# Patient Record
Sex: MALE | Race: WHITE
[De-identification: names, ages, dates, MRNs, and addresses within clinical notes are randomized per-mention and may not be internally consistent; named-entity substitution may affect disease eponyms.]

---

## 2018-11-01 ENCOUNTER — HOSPITAL ENCOUNTER (OUTPATIENT)
Dept: HOSPITAL 92 - SDC | Age: 48
Discharge: HOME | End: 2018-11-01
Attending: OPHTHALMOLOGY
Payer: COMMERCIAL

## 2018-11-01 DIAGNOSIS — Z79.899: ICD-10-CM

## 2018-11-01 DIAGNOSIS — H33.001: Primary | ICD-10-CM

## 2018-11-01 PROCEDURE — C1814 RETINAL TAMP, SILICONE OIL: HCPCS

## 2018-11-01 PROCEDURE — 08T53ZZ RESECTION OF LEFT VITREOUS, PERCUTANEOUS APPROACH: ICD-10-PCS | Performed by: OPHTHALMOLOGY

## 2018-11-02 NOTE — OP
DATE OF PROCEDURE:  11/01/2018

 

POSTOPERATIVE DIAGNOSES:  Rhegmatogenous retinal detachment, right eye; lattice degeneration, left ey
e.

 

PROCEDURES:  Complex retinal detachment repair, right eye.  Laser barricade, left eye.

 

SURGEON :  Madi Goins M.D.

 

ANESTHESIA:  General endotracheal anesthesia.

 

PROCEDURE IN DETAIL:  Patient was identified in the preoperative holding area.  Appropriate informed 
consent for the planned surgical procedure on both eyes was obtained.  Patient was transported to the
 operative suite, where appropriate cardiopulmonary monitoring established.  General endotracheal ane
sthesia was initiated.  A 360-laser plate barricade was placed in the left eye using indirect laser d
elivery device.  The patient was prepped and draped in the usual sterile manner for ophthalmic surger
y on the right eye.  Lid speculum was placed in the right eye.  The 25-gauge trocars placed in conjun
ctiva and sclera supratemporally, inferotemporally, and supranasally.  Infusion line was placed infer
otemporally.  Light pipe and vitreous cutter were inserted into the eye.  Core vitrectomy was perform
ed.  Special attention was turned to the peripheral retina.  This was carefully dissected using wide 
field viewing system with special attention to the giant retinal tear superior temporally.  Perfluoro
ctane was infused on top of the retina, flattening the retina completely.  Laser was placed 360 with 
special attention to the giant retinal tear.  Posterior drain retinotomy was created and complete air
 fluid exchange was performed with careful removal of subretinal fluid.  No posterior slippage of the
 posterior retina was identified.  Ten minutes were allowed for fluid to drain posteriorly.  Posterio
r pole was washed with balanced salt solution and silicone oil was infused into the eye.  Sclerotomie
s were sutured closed with 7-0 Vicryl suture.  Conjunctiva was closed with 6-0 plain gut suture.  Ret
robulbar Kenalog and subconjunctival Ancef were placed.  Atropine and antibiotic ointment placed, and
 the eye was patched and shielded.  The patient was awakened and taken to the postoperative recovery 
unit in good condition having suffered no immediate perioperative period.  The patient was advised to
 position left side down and follow up tomorrow with Dr. Goins.

## 2019-02-07 ENCOUNTER — HOSPITAL ENCOUNTER (OUTPATIENT)
Dept: HOSPITAL 92 - SDC | Age: 49
Discharge: HOME | End: 2019-02-07
Attending: OPHTHALMOLOGY
Payer: COMMERCIAL

## 2019-02-07 VITALS — BODY MASS INDEX: 31.8 KG/M2

## 2019-02-07 DIAGNOSIS — Z79.899: ICD-10-CM

## 2019-02-07 DIAGNOSIS — H43.311: Primary | ICD-10-CM

## 2019-02-07 PROCEDURE — 08T43ZZ RESECTION OF RIGHT VITREOUS, PERCUTANEOUS APPROACH: ICD-10-PCS | Performed by: OPHTHALMOLOGY

## 2019-02-07 NOTE — OP
DATE OF PROCEDURE:  02/07/2019



PREOPERATIVE DIAGNOSIS:  Vitreous opacification, right eye.



POSTOPERATIVE DIAGNOSIS:  Vitreous opacification, right eye.



PROCEDURES PERFORMED:  Pars plana vitrectomy and membrane peel, right eye.



ANESTHESIA:  Local with monitored anesthesia care.



DESCRIPTION OF PROCEDURE:  The patient was identified in the preoperative holding

area.  Appropriate informed consent for the planned surgical procedure on the right

eye had been obtained.  The patient was transported to the operative suite, where

appropriate cardiopulmonary monitoring was established.  Local anesthesia was

obtained using retrobulbar modified Van Lint lid block using 50:50 mixture of 4%

lidocaine and 0.75% bupivacaine.  The patient was prepped and draped in the usual

sterile manner for ophthalmic surgery on the right eye.  Lid speculum was placed in

the right eye.  A 25-gauge trocar was placed through the conjunctiva and sclera

superotemporally, inferotemporally, and supranasally.  Infusion line was placed

inferotemporally.  Light pipe vitreous cutter was inserted into the eye.  Core

vitrectomy was performed.  Viscous fluid was removed from the eye using a viscous

fluid removal device.  Oil was removed __________ and no holes, tears, or retinal

elevation were noted.  Sclerotomy was suture closed with 7-0 Vicryl suture.

Conjunctiva was closed with 6-0 plain gut suture.  Retrobulbar Kenalog and

subconjunctival Ancef were placed. 

Atropine antibiotic ointment was placed, and the eye was patched and shielded. The

patient was taken to the postoperative recovery unit in good condition, having

suffered no immediate perioperative complications.  The patient was instructed to

keep the patch and shield on, avoid lifting or bending, and followup in the morning

with Dr. Goins. 







Job ID:  [id number]